# Patient Record
Sex: FEMALE | Race: WHITE | NOT HISPANIC OR LATINO | Employment: UNEMPLOYED | URBAN - METROPOLITAN AREA
[De-identification: names, ages, dates, MRNs, and addresses within clinical notes are randomized per-mention and may not be internally consistent; named-entity substitution may affect disease eponyms.]

---

## 2017-01-12 ENCOUNTER — TELEPHONE (OUTPATIENT)
Dept: OBSTETRICS AND GYNECOLOGY | Facility: CLINIC | Age: 47
End: 2017-01-12

## 2017-01-12 NOTE — TELEPHONE ENCOUNTER
----- Message from Shytahmina Enrique sent at 1/12/2017  2:15 PM CST -----  Contact: Self  Pt request mammogram order so she can schedule to get it done on the same day as her annual exam. Please contact the pt at [phone] if any question. Thanks!

## 2017-01-16 ENCOUNTER — TELEPHONE (OUTPATIENT)
Dept: OBSTETRICS AND GYNECOLOGY | Facility: CLINIC | Age: 47
End: 2017-01-16

## 2017-01-16 NOTE — TELEPHONE ENCOUNTER
----- Message from Erinn Forman sent at 1/13/2017  2:24 PM CST -----  Contact: Self  Pt called to check status of mammogram order. Pt can be reached @ [phone].    1/16/17  09:15am  Call placed to Ms Ambrocio, pamella , message left for pt to return call to office

## 2017-01-19 ENCOUNTER — OFFICE VISIT (OUTPATIENT)
Dept: OBSTETRICS AND GYNECOLOGY | Facility: CLINIC | Age: 47
End: 2017-01-19
Payer: MEDICAID

## 2017-01-19 VITALS
WEIGHT: 137.13 LBS | SYSTOLIC BLOOD PRESSURE: 140 MMHG | DIASTOLIC BLOOD PRESSURE: 90 MMHG | BODY MASS INDEX: 22.13 KG/M2

## 2017-01-19 DIAGNOSIS — Z01.419 WELL WOMAN EXAM WITH ROUTINE GYNECOLOGICAL EXAM: Primary | ICD-10-CM

## 2017-01-19 DIAGNOSIS — Z11.3 SCREEN FOR STD (SEXUALLY TRANSMITTED DISEASE): ICD-10-CM

## 2017-01-19 DIAGNOSIS — Z12.4 CERVICAL CANCER SCREENING: ICD-10-CM

## 2017-01-19 DIAGNOSIS — Z12.39 BREAST CANCER SCREENING: ICD-10-CM

## 2017-01-19 PROCEDURE — 99999 PR PBB SHADOW E&M-EST. PATIENT-LVL III: CPT | Mod: PBBFAC,,, | Performed by: OBSTETRICS & GYNECOLOGY

## 2017-01-19 PROCEDURE — 99386 PREV VISIT NEW AGE 40-64: CPT | Mod: S$PBB,,, | Performed by: OBSTETRICS & GYNECOLOGY

## 2017-01-19 PROCEDURE — 87591 N.GONORRHOEAE DNA AMP PROB: CPT

## 2017-01-19 PROCEDURE — 87624 HPV HI-RISK TYP POOLED RSLT: CPT

## 2017-01-19 PROCEDURE — 88175 CYTOPATH C/V AUTO FLUID REDO: CPT

## 2017-01-19 PROCEDURE — 99213 OFFICE O/P EST LOW 20 MIN: CPT | Mod: PBBFAC | Performed by: OBSTETRICS & GYNECOLOGY

## 2017-01-19 RX ORDER — LAMOTRIGINE 25 MG/1
25 TABLET ORAL DAILY
COMMUNITY
End: 2018-05-26

## 2017-01-19 RX ORDER — OXCARBAZEPINE 300 MG/1
TABLET, FILM COATED ORAL
Refills: 0 | COMMUNITY
Start: 2016-12-02 | End: 2017-01-19

## 2017-01-19 RX ORDER — BUPRENORPHINE HYDROCHLORIDE, NALOXONE HYDROCHLORIDE 8; 2 MG/1; MG/1
FILM, SOLUBLE BUCCAL; SUBLINGUAL
Refills: 4 | COMMUNITY
Start: 2017-01-16 | End: 2018-05-26

## 2017-01-19 RX ORDER — VARENICLINE TARTRATE 0.5 (11)-1
1 KIT ORAL
Refills: 0 | COMMUNITY
Start: 2017-01-13 | End: 2017-02-02

## 2017-01-19 NOTE — MR AVS SNAPSHOT
[facility] - OB/ GYN  [address], [address]  [place] [address]  Phone: [phone]                  Fabiola Ambrocio   2017 11:10 AM   Office Visit    Description:  Female : 1970   Provider:  Hai Peck MD   Department:  [facility] - OB/ GYN           Reason for Visit     Annual Exam                To Do List           Goals (5 Years of Data)     None      [facility]     [facility] On Call Nurse Care Line -  Assistance  Registered nurses in the [facility] Center provide clinical advisement, health education, appointment booking, and other advisory services.  Call for this free service at [phone].             Medications           Message regarding Medications     Verify the changes and/or additions to your medication regime listed below are the same as discussed with your clinician today.  If any of these changes or additions are incorrect, please notify your healthcare provider.             Verify that the below list of medications is an accurate representation of the medications you are currently taking.  If none reported, the list may be blank. If incorrect, please contact your healthcare provider. Carry this list with you in case of emergency.           Current Medications     citalopram (CELEXA) 40 MG tablet Take 1 tablet (40 mg total) by mouth once daily.    paroxetine (PAXIL) 20 MG tablet Take 20 mg by mouth every morning.    amlodipine (NORVASC) 5 MG tablet Take 1 tablet (5 mg total) by mouth once daily.    CHANTIX STARTING MONTH BOX 0.5 mg (11)- 1 mg (42) tablet Take 1 tablet by mouth as needed.    hydrochlorothiazide (HYDRODIURIL) 25 MG tablet Take 1 tablet (25 mg total) by mouth once daily.    lisinopril 10 MG tablet Take 1 tablet (10 mg total) by mouth once daily.    LURASIDONE HCL (LATUDA ORAL) Take by mouth.    oxcarbazepine (TRILEPTAL) 300 MG Tab TK 1 T PO  BID    SUBOXONE 8-2 mg Film DISSOLVE 1 film TWICE DAILY           Clinical Reference Information            Vital Signs - Last Recorded  Most recent update: 1/19/2017 11:24 AM by Glenis Harper MA    BP Wt LMP BMI       (!) 140/90 62.2 kg (137 lb 2 oz) 01/06/2017 (Exact Date) 22.13 kg/m2       Blood Pressure          Most Recent Value    BP  (!)  140/90      Allergies as of 1/19/2017     Sulfa (Sulfonamide Antibiotics)      Immunizations Administered on Date of Encounter - 1/19/2017     None      [facility] Sign-Up     Activating your [facility] account is as easy as 1-2-3!     1) Visit [email or web address], select Sign Up Now, enter this activation code and your date of birth, then select Next.  [email or web address]  Expires: 3/5/2017 11:29 AM      2) Create a username and password to use when you visit [facility] in the future and select a security question in case you lose your password and select Next.    3) Enter your e-mail address and click Sign Up!    Additional Information  If you have questions, please e-mail [email or web address] or call [phone] to talk to our [facility] staff. Remember, [facility] is NOT to be used for urgent needs. For medical emergencies, dial 911.         Smoking Cessation     If you would like to quit smoking:   You may be eligible for free services if you are a [place] resident and started smoking cigarettes before September 1, 1988.  Call the [employer]ess[employer] ([employer]) toll free at (523) 364-0038 or (358) 894-5632.   Call 5-800-QUIT-NOW if you do not meet the above criteria.

## 2017-01-20 NOTE — PROGRESS NOTES
[facility]  Ambulatory Clinic  Obstetrics & Gynecology    Visit Date:  2017    Chief Complaint:  Annual GYN exam    History of Present Illness:      Fabiola Ambrocio is a 46 y.o. , new pt to me, here for a gynecologic exam.      Pt has no major complaints today.     Periods are regular, not heavy or painful.    Her current method of family planning is condoms.    Pt denies family h/o adverse reaction to hormonal contraception.    Pt denies h/o abnormal pap, last pap ~8 years ago per pt.    Pt denies active sexually transmitted infections.    Pt states she has never had a mammogram.    Pt performs monthly self breast examination, trying to quite smoking, uses seat belts, and denies abuse.     Pt denies any abnormal vaginal bleeding, vaginal discharge, dysmenorrhea, dyspareunia, pelvic pain, bloating, early satiety, unintentional weight loss, breast mass/skin changes, incontinence, GI or urinary complaints.      Otherwise, the pt is in her usual state of health and has good follow-up with her PCP.    Past History:  Gynecologic history as noted above.    Review of Systems:      GENERAL:  No fever, fatigue, excessive weight gain or loss  HEENT:  No headaches, hearing changes, visual disturbance  RESPIRATORY:  No cough, shortness of breath  CARDIOVASCULAR:  No chest pain, heart palpitations, leg swelling  BREAST:  No lump, pain, nipple discharge, skin changes  GASTROINTESTINAL:  No nausea, vomiting, constipation, diarrhea, abd pain, rectal bleeding   GENITOURINARY:  See HPI  ENDOCRINE:  No heat or cold intolerance  HEMATOLOGIC:  No easy bruisability or bleeding   LYMPHATICS:  No enlarged nodes  MUSCULOSKELETAL:  Chronic back pain  SKIN:  No rash, lesions, jaundice  NEUROLOGIC:  No dizziness, weakness, syncope  PSYCHIATRIC:  No homicidal/suicidal ideations    Physical Exam:     Visit Vitals    BP (!) 140/90    Wt 62.2 kg (137 lb 2 oz)    LMP 2017 (Exact Date)    BMI 22.13 kg/m2    Pulse 60, Resp rate 16  Patient's last menstrual period was 2017 (exact date).     GENERAL:  No acute distress, well-nourished  HEENT:  Atraumatic, anicteric, moist mucus membranes. Neck supple w/o masses.  BREAST:  Symmetric, nontender, no obvious masses, adenopathy, skin changes or nipple discharge.  LUNGS:  Clear to auscultation  HEART:  Regular rate and rhythm, no murmurs, gallops, or rubs  ABDOMEN:  Soft, non-tender, non-distended, normoactive bowel sounds, no obvious organomegaly  EXT:  Symmetric w/o cramping, claudication, or edema. +2 distal pulses.  SKIN:  No rashes or bruising  PSYCH:  Mood and affect appropriate    GENITOURINARY:    VULVAR:  Female external genitalia w/o obvious lesions. Female hair distribution. Normal urethral meatus. No gross lymphadenopathy.   VAGINA:  Pink, moist, well-rugated. Good support. No obvious lesion. No discharge.  CERVIX:  No cervical motion tenderness, discharge, or obvious lesions.   UTERUS:  Small, non-tender, normal contour  ADNEXA:  No masses, non-tender    RECTAL:  Declined. No obvious external lesions  WET PREP:  Negative     Chaperone present for exam.    Assessment:     46 y.o. :    1. Well woman gynecologic exam  2. Family planning    Plan:    A gynecologic health assessment was performed with age appropriate counseling.    Cervical cancer screening - pap obtained.    STI screening - pt requested gonorrhea & chlamydia testing.  Pt declined other STI testing including HIV.  Safe sex discussed.      Order screening mammogram.    We discuss her contraceptive options.  Pt is requesting Copper IUD.  Risks, benefits, and alternatives to IUD reviewed.  Will schedule pt for IUD insertion on received pending insurance verification.  I meantime time, she will use condoms.      Encourage healthy lifestyle modifications, monthly self breast exams, Ca/Vit D.    Encourage tobacco cessation, pt is in smoking cessation therapy.    F/u with PCP for health  maintenance.    Pt will call clinic to schedule her IUD insertion within 7 days at start of her next cycle once IUD received.    Return sooner as needed.  All questions answered, pt voiced understanding.        Hai Peck MD

## 2017-01-23 LAB
C TRACH DNA SPEC QL NAA+PROBE: NEGATIVE
N GONORRHOEA DNA SPEC QL NAA+PROBE: NEGATIVE

## 2017-02-02 ENCOUNTER — CLINICAL SUPPORT (OUTPATIENT)
Dept: SMOKING CESSATION | Facility: CLINIC | Age: 47
End: 2017-02-02
Payer: COMMERCIAL

## 2017-02-02 VITALS — WEIGHT: 137 LBS | SYSTOLIC BLOOD PRESSURE: 140 MMHG | DIASTOLIC BLOOD PRESSURE: 90 MMHG | BODY MASS INDEX: 22.11 KG/M2

## 2017-02-02 DIAGNOSIS — F17.200 NICOTINE DEPENDENCE: Primary | ICD-10-CM

## 2017-02-02 PROCEDURE — 99999 PR PBB SHADOW E&M-EST. PATIENT-LVL II: CPT | Mod: PBBFAC,,,

## 2017-02-02 PROCEDURE — 99404 PREV MED CNSL INDIV APPRX 60: CPT | Mod: S$GLB,,,

## 2017-02-02 RX ORDER — VARENICLINE TARTRATE 0.5 (11)-1
KIT ORAL
Qty: 1 PACKAGE | Refills: 0 | Status: SHIPPED | OUTPATIENT
Start: 2017-02-02 | End: 2018-05-26

## 2017-02-02 NOTE — Clinical Note
Pt seen at intake today. She currently smokes 20-30 cigs/day.Patient has used it in the past for a quit and she said it is the only thing she has tried that works.  Pt said she has used NRT patches , lozenge and gum and was unsuccessful.   She said her skin does not tolerate the patch. Pt started on rate reduction and wait time of 15 min prior to smoking. Will see pt back in office within 2 weeks . Patient agrees to weekly appointments but she has to wait until her  gets his work schedule to schedule the first visit. She also understands that mail order will be used and that will require 1-2 weeks for delivery.  Pt's exhaled carbon monoxide level was 16   ppm as per [employer].  Please see Tobacco Cessation Intake Form for patient assessment and recommendations.

## 2017-02-07 PROBLEM — I10 ESSENTIAL HYPERTENSION, BENIGN: Status: ACTIVE | Noted: 2017-02-07

## 2017-02-07 PROBLEM — Z72.0 TOBACCO ABUSE: Status: ACTIVE | Noted: 2017-02-07

## 2017-02-07 PROBLEM — Z71.6 TOBACCO ABUSE COUNSELING: Status: ACTIVE | Noted: 2017-02-07

## 2017-02-07 PROBLEM — F31.9 BIPOLAR DISORDER: Status: ACTIVE | Noted: 2017-02-07

## 2017-02-07 PROBLEM — M54.12 CERVICAL RADICULOPATHY: Status: ACTIVE | Noted: 2017-02-07

## 2017-05-11 ENCOUNTER — TELEPHONE (OUTPATIENT)
Dept: OBSTETRICS AND GYNECOLOGY | Facility: CLINIC | Age: 47
End: 2017-05-11

## 2017-05-11 NOTE — TELEPHONE ENCOUNTER
Touched basis with the pt to see if she was still interested in the [employer]ag[employer](buy and bill) and she stated she thought she spoke to someone and told them she didn't want it, her  didn't want her with it. I informed her I will make sure to document in her chart. Pt voiced understanding and call ended. kt

## 2018-01-18 ENCOUNTER — CLINICAL SUPPORT (OUTPATIENT)
Dept: SMOKING CESSATION | Facility: CLINIC | Age: 48
End: 2018-01-18
Payer: COMMERCIAL

## 2018-01-18 DIAGNOSIS — F17.200 NICOTINE DEPENDENCE: Primary | ICD-10-CM

## 2018-01-18 PROCEDURE — 99406 BEHAV CHNG SMOKING 3-10 MIN: CPT | Mod: S$GLB,,, | Performed by: INTERNAL MEDICINE

## 2018-05-26 ENCOUNTER — HOSPITAL ENCOUNTER (EMERGENCY)
Facility: HOSPITAL | Age: 48
Discharge: HOME | End: 2018-05-26
Attending: EMERGENCY MEDICINE
Payer: MEDICAID

## 2018-05-26 VITALS
OXYGEN SATURATION: 98 % | SYSTOLIC BLOOD PRESSURE: 129 MMHG | HEART RATE: 100 BPM | WEIGHT: 130 LBS | DIASTOLIC BLOOD PRESSURE: 66 MMHG | HEIGHT: 66 IN | BODY MASS INDEX: 20.89 KG/M2 | RESPIRATION RATE: 18 BRPM | TEMPERATURE: 98 F

## 2018-05-26 DIAGNOSIS — R07.9 CHEST PAIN: ICD-10-CM

## 2018-05-26 DIAGNOSIS — F14.90 COCAINE USE: ICD-10-CM

## 2018-05-26 DIAGNOSIS — I10 UNCONTROLLED HYPERTENSION: Primary | ICD-10-CM

## 2018-05-26 LAB
ALBUMIN SERPL BCP-MCNC: 3.9 G/DL
ALP SERPL-CCNC: 73 U/L
ALT SERPL W/O P-5'-P-CCNC: 23 U/L
ANION GAP SERPL CALC-SCNC: 12 MMOL/L
AST SERPL-CCNC: 25 U/L
B-HCG UR QL: NEGATIVE
BACTERIA #/AREA URNS HPF: ABNORMAL /HPF
BASOPHILS # BLD AUTO: 0.02 K/UL
BASOPHILS NFR BLD: 0.2 %
BILIRUB SERPL-MCNC: 0.8 MG/DL
BILIRUB UR QL STRIP: NEGATIVE
BNP SERPL-MCNC: 158 PG/ML
BUN SERPL-MCNC: 13 MG/DL
CALCIUM SERPL-MCNC: 9.2 MG/DL
CHLORIDE SERPL-SCNC: 102 MMOL/L
CLARITY UR: CLEAR
CO2 SERPL-SCNC: 25 MMOL/L
COLOR UR: ABNORMAL
CREAT SERPL-MCNC: 1.1 MG/DL
CTP QC/QA: YES
DIFFERENTIAL METHOD: ABNORMAL
EOSINOPHIL # BLD AUTO: 0 K/UL
EOSINOPHIL NFR BLD: 0.2 %
ERYTHROCYTE [DISTWIDTH] IN BLOOD BY AUTOMATED COUNT: 12.1 %
EST. GFR  (AFRICAN AMERICAN): >60 ML/MIN/1.73 M^2
EST. GFR  (NON AFRICAN AMERICAN): >60 ML/MIN/1.73 M^2
GLUCOSE SERPL-MCNC: 102 MG/DL
GLUCOSE UR QL STRIP: NEGATIVE
HCT VFR BLD AUTO: 45 %
HGB BLD-MCNC: 16.2 G/DL
HGB UR QL STRIP: ABNORMAL
HYALINE CASTS #/AREA URNS LPF: 0 /LPF
INR PPP: 0.9
KETONES UR QL STRIP: NEGATIVE
LEUKOCYTE ESTERASE UR QL STRIP: ABNORMAL
LIPASE SERPL-CCNC: 35 U/L
LYMPHOCYTES # BLD AUTO: 1.4 K/UL
LYMPHOCYTES NFR BLD: 12.6 %
MAGNESIUM SERPL-MCNC: 2.1 MG/DL
MCH RBC QN AUTO: 36.3 PG
MCHC RBC AUTO-ENTMCNC: 36 G/DL
MCV RBC AUTO: 101 FL
MICROSCOPIC COMMENT: ABNORMAL
MONOCYTES # BLD AUTO: 0.6 K/UL
MONOCYTES NFR BLD: 5.6 %
NEUTROPHILS # BLD AUTO: 8.9 K/UL
NEUTROPHILS NFR BLD: 81.4 %
NITRITE UR QL STRIP: POSITIVE
PH UR STRIP: 5 [PH] (ref 5–8)
PLATELET # BLD AUTO: 230 K/UL
PMV BLD AUTO: 9.4 FL
POTASSIUM SERPL-SCNC: 3.3 MMOL/L
PROT SERPL-MCNC: 7.5 G/DL
PROT UR QL STRIP: ABNORMAL
PROTHROMBIN TIME: 9.4 SEC
RBC # BLD AUTO: 4.46 M/UL
RBC #/AREA URNS HPF: >100 /HPF (ref 0–4)
SODIUM SERPL-SCNC: 139 MMOL/L
SP GR UR STRIP: 1.02 (ref 1–1.03)
SQUAMOUS #/AREA URNS HPF: 4 /HPF
TROPONIN I SERPL DL<=0.01 NG/ML-MCNC: 0.01 NG/ML
URN SPEC COLLECT METH UR: ABNORMAL
UROBILINOGEN UR STRIP-ACNC: ABNORMAL EU/DL
WBC # BLD AUTO: 10.89 K/UL
WBC #/AREA URNS HPF: 50 /HPF (ref 0–5)

## 2018-05-26 PROCEDURE — S4991 NICOTINE PATCH NONLEGEND: HCPCS | Performed by: EMERGENCY MEDICINE

## 2018-05-26 PROCEDURE — 96361 HYDRATE IV INFUSION ADD-ON: CPT

## 2018-05-26 PROCEDURE — 83880 ASSAY OF NATRIURETIC PEPTIDE: CPT

## 2018-05-26 PROCEDURE — 81025 URINE PREGNANCY TEST: CPT | Performed by: PHYSICIAN ASSISTANT

## 2018-05-26 PROCEDURE — 99284 EMERGENCY DEPT VISIT MOD MDM: CPT | Mod: 25

## 2018-05-26 PROCEDURE — 81000 URINALYSIS NONAUTO W/SCOPE: CPT

## 2018-05-26 PROCEDURE — 93005 ELECTROCARDIOGRAM TRACING: CPT

## 2018-05-26 PROCEDURE — 80053 COMPREHEN METABOLIC PANEL: CPT

## 2018-05-26 PROCEDURE — 25000003 PHARM REV CODE 250: Performed by: EMERGENCY MEDICINE

## 2018-05-26 PROCEDURE — 85610 PROTHROMBIN TIME: CPT

## 2018-05-26 PROCEDURE — 83690 ASSAY OF LIPASE: CPT

## 2018-05-26 PROCEDURE — 96374 THER/PROPH/DIAG INJ IV PUSH: CPT

## 2018-05-26 PROCEDURE — 63600175 PHARM REV CODE 636 W HCPCS: Performed by: EMERGENCY MEDICINE

## 2018-05-26 PROCEDURE — 93010 ELECTROCARDIOGRAM REPORT: CPT | Mod: ,,, | Performed by: INTERNAL MEDICINE

## 2018-05-26 PROCEDURE — 85025 COMPLETE CBC W/AUTO DIFF WBC: CPT

## 2018-05-26 PROCEDURE — 83735 ASSAY OF MAGNESIUM: CPT

## 2018-05-26 PROCEDURE — 84484 ASSAY OF TROPONIN QUANT: CPT

## 2018-05-26 PROCEDURE — 96375 TX/PRO/DX INJ NEW DRUG ADDON: CPT

## 2018-05-26 RX ORDER — CLONIDINE HYDROCHLORIDE 0.1 MG/1
0.2 TABLET ORAL
Status: COMPLETED | OUTPATIENT
Start: 2018-05-26 | End: 2018-05-26

## 2018-05-26 RX ORDER — HYDRALAZINE HYDROCHLORIDE 20 MG/ML
20 INJECTION INTRAMUSCULAR; INTRAVENOUS
Status: COMPLETED | OUTPATIENT
Start: 2018-05-26 | End: 2018-05-26

## 2018-05-26 RX ORDER — IBUPROFEN 200 MG
1 TABLET ORAL
Status: DISCONTINUED | OUTPATIENT
Start: 2018-05-26 | End: 2018-05-26 | Stop reason: HOSPADM

## 2018-05-26 RX ORDER — LORAZEPAM 0.5 MG/1
2 TABLET ORAL
Status: COMPLETED | OUTPATIENT
Start: 2018-05-26 | End: 2018-05-26

## 2018-05-26 RX ORDER — LISINOPRIL 10 MG/1
40 TABLET ORAL DAILY
Qty: 90 TABLET | Refills: 0 | Status: SHIPPED | OUTPATIENT
Start: 2018-05-26 | End: 2021-11-16 | Stop reason: ALTCHOICE

## 2018-05-26 RX ADMIN — PROMETHAZINE HYDROCHLORIDE 12.5 MG: 25 INJECTION INTRAMUSCULAR; INTRAVENOUS at 06:05

## 2018-05-26 RX ADMIN — LORAZEPAM 2 MG: 0.5 TABLET ORAL at 05:05

## 2018-05-26 RX ADMIN — SODIUM CHLORIDE 1000 ML: 0.9 INJECTION, SOLUTION INTRAVENOUS at 05:05

## 2018-05-26 RX ADMIN — HYDRALAZINE HYDROCHLORIDE 20 MG: 20 INJECTION INTRAMUSCULAR; INTRAVENOUS at 06:05

## 2018-05-26 RX ADMIN — NICOTINE 1 PATCH: 21 PATCH, EXTENDED RELEASE TRANSDERMAL at 05:05

## 2018-05-26 RX ADMIN — CLONIDINE HYDROCHLORIDE 0.2 MG: 0.1 TABLET ORAL at 06:05

## 2018-05-26 NOTE — ED PROVIDER NOTES
Encounter Date: 2018    SORT:  47 y.o. female presenting to ED for CP since this morning. Emesis yesterday. Smoker. EtOH yesterday and Hx of pancreatitis. Denies fever. Limited triage exam:  Non-toxic. If orders were placed, they are pending.      Gaurang Mittal PA-C  2018  4:42 PM      History     Chief Complaint   Patient presents with    Chest Pain     chest pain today.  + vomiting yesterday.  center chest, non-radiating, sharp in nature.   denies diarrhea or fever.    Hypertension     states pressure at home was 269/170.  been non-compliant w/ meds.  did take it today.  denies headaches.     47-year-old female history of alcoholic pancreatitis cocaine use and hypertension noncompliant with medications but very compliant with her cocaine use presents with chest pain and elevated blood pressure denies any abdominal pain nausea vomiting diarrhea constipation no hematemesis melena or any GI issues no headaches          Review of patient's allergies indicates:   Allergen Reactions    Sulfa (sulfonamide antibiotics) Rash     Past Medical History:   Diagnosis Date    Anxiety     Bipolar disorder     Hypertension     Pancreatitis      Past Surgical History:   Procedure Laterality Date    BACK SURGERY      basal cell carcinoma surgery       SECTION       Family History   Problem Relation Age of Onset    Drug abuse Mother     Drug abuse Father     Cancer Paternal Grandmother         breast     Social History   Substance Use Topics    Smoking status: Current Every Day Smoker     Packs/day: 1.50     Types: Cigarettes    Smokeless tobacco: Never Used    Alcohol use 3.0 oz/week     5 Shots of liquor per week      Comment: every day     Review of Systems   Constitutional: Negative.    HENT: Negative.    Eyes: Negative.    Respiratory: Negative.    Cardiovascular: Positive for chest pain.   Gastrointestinal: Negative.    Endocrine: Negative.    Genitourinary: Negative.    Musculoskeletal:  Negative.    Neurological: Negative.    Hematological: Negative.    Psychiatric/Behavioral: Negative.    All other systems reviewed and are negative.      Physical Exam     Initial Vitals [05/26/18 1639]   BP Pulse Resp Temp SpO2   (!) 220/127 98 18 97.4 °F (36.3 °C) 99 %      MAP       158         Physical Exam    Nursing note and vitals reviewed.  Constitutional: She appears well-developed and well-nourished.   HENT:   Head: Normocephalic and atraumatic.   Eyes: Conjunctivae and EOM are normal. Pupils are equal, round, and reactive to light.   Neck: Normal range of motion. Neck supple.   Cardiovascular: Normal rate and regular rhythm.   Abdominal: Soft. Bowel sounds are normal.   Musculoskeletal: Normal range of motion.   Neurological: She is alert and oriented to person, place, and time. She has normal reflexes.   Skin: Skin is warm and dry.   Psychiatric: She has a normal mood and affect. Her behavior is normal. Judgment and thought content normal.         ED Course   Procedures  Labs Reviewed   URINALYSIS - Abnormal; Notable for the following:        Result Value    Protein, UA 2+ (*)     Occult Blood UA 3+ (*)     Nitrite, UA Positive (*)     Urobilinogen, UA 4.0-6.0 (*)     Leukocytes, UA 1+ (*)     All other components within normal limits   CBC W/ AUTO DIFFERENTIAL - Abnormal; Notable for the following:     Hemoglobin 16.2 (*)      (*)     MCH 36.3 (*)     Gran # (ANC) 8.9 (*)     Gran% 81.4 (*)     Lymph% 12.6 (*)     All other components within normal limits   COMPREHENSIVE METABOLIC PANEL - Abnormal; Notable for the following:     Potassium 3.3 (*)     All other components within normal limits   B-TYPE NATRIURETIC PEPTIDE - Abnormal; Notable for the following:      (*)     All other components within normal limits   URINALYSIS MICROSCOPIC - Abnormal; Notable for the following:     RBC, UA >100 (*)     WBC, UA 50 (*)     Bacteria, UA Many (*)     All other components within normal limits    LIPASE   TROPONIN I   PROTIME-INR   MAGNESIUM   POCT URINE PREGNANCY                          Attending Attestation:     Physician Attestation Statement for NP/PA:   I have conducted a face to face encounter with this patient in addition to the NP/PA, due to          Attending ED Notes:   Blood pressure decreased patient improved and will dc home              Clinical Impression:   The primary encounter diagnosis was Uncontrolled hypertension. Diagnoses of Chest pain and Cocaine use were also pertinent to this visit.    Disposition:   Disposition: Discharged  Condition: Stable                        Steffen Soliz MD  05/26/18 3211

## 2018-05-26 NOTE — ED TRIAGE NOTES
Patient C/O chest pain that started this morning when waking up. Patient stated that pain is 6/10 and sharp with intermittent SOB. Patient stated that she has HTN but doesn't take medication for it. She has recently taken her Mothers medication of Lisinopril. Patient took Ibuprofen this morning for pain with no relief.

## 2018-05-27 NOTE — ED NOTES
Pt care assume. Pt AAO with RR easy even and full. NAD noted at this time. Awaiting further orders. Will continue to monitor.

## 2018-05-27 NOTE — ED NOTES
PIC d/c'd with cath tip intact. Bandage placed at site. Discharge instructions provided and pt verbalized understanding of need of follow up and medications.

## 2018-10-05 ENCOUNTER — HOSPITAL ENCOUNTER (OUTPATIENT)
Facility: HOSPITAL | Age: 48
Discharge: HOME | End: 2018-10-06
Attending: EMERGENCY MEDICINE | Admitting: EMERGENCY MEDICINE
Payer: MEDICAID

## 2018-10-05 DIAGNOSIS — N39.0 URINARY TRACT INFECTION WITHOUT HEMATURIA, SITE UNSPECIFIED: Primary | ICD-10-CM

## 2018-10-05 DIAGNOSIS — R25.1 TREMOR: ICD-10-CM

## 2018-10-05 DIAGNOSIS — K29.20 ALCOHOLIC GASTRITIS, PRESENCE OF BLEEDING UNSPECIFIED, UNSPECIFIED CHRONICITY: ICD-10-CM

## 2018-10-05 DIAGNOSIS — I10 UNCONTROLLED HYPERTENSION: ICD-10-CM

## 2018-10-05 LAB
ALBUMIN SERPL BCP-MCNC: 3.9 G/DL
ALP SERPL-CCNC: 76 U/L
ALT SERPL W/O P-5'-P-CCNC: 22 U/L
ANION GAP SERPL CALC-SCNC: 13 MMOL/L
AST SERPL-CCNC: 23 U/L
B-HCG UR QL: NEGATIVE
BACTERIA #/AREA URNS HPF: ABNORMAL /HPF
BASOPHILS # BLD AUTO: 0.01 K/UL
BASOPHILS NFR BLD: 0.1 %
BILIRUB SERPL-MCNC: 1 MG/DL
BILIRUB UR QL STRIP: NEGATIVE
BUN SERPL-MCNC: 11 MG/DL
CALCIUM SERPL-MCNC: 9.8 MG/DL
CHLORIDE SERPL-SCNC: 96 MMOL/L
CLARITY UR: ABNORMAL
CO2 SERPL-SCNC: 27 MMOL/L
COLOR UR: YELLOW
CREAT SERPL-MCNC: 1.2 MG/DL
CTP QC/QA: YES
DIFFERENTIAL METHOD: ABNORMAL
EOSINOPHIL # BLD AUTO: 0 K/UL
EOSINOPHIL NFR BLD: 0 %
ERYTHROCYTE [DISTWIDTH] IN BLOOD BY AUTOMATED COUNT: 12.6 %
EST. GFR  (AFRICAN AMERICAN): >60 ML/MIN/1.73 M^2
EST. GFR  (NON AFRICAN AMERICAN): 54 ML/MIN/1.73 M^2
ETHANOL SERPL-MCNC: <10 MG/DL
GLUCOSE SERPL-MCNC: 117 MG/DL
GLUCOSE UR QL STRIP: NEGATIVE
HCT VFR BLD AUTO: 44 %
HGB BLD-MCNC: 15.5 G/DL
HGB UR QL STRIP: ABNORMAL
HYALINE CASTS #/AREA URNS LPF: 0 /LPF
KETONES UR QL STRIP: NEGATIVE
LEUKOCYTE ESTERASE UR QL STRIP: ABNORMAL
LIPASE SERPL-CCNC: 17 U/L
LYMPHOCYTES # BLD AUTO: 0.7 K/UL
LYMPHOCYTES NFR BLD: 5.8 %
MCH RBC QN AUTO: 36.7 PG
MCHC RBC AUTO-ENTMCNC: 35.2 G/DL
MCV RBC AUTO: 104 FL
MICROSCOPIC COMMENT: ABNORMAL
MONOCYTES # BLD AUTO: 0.8 K/UL
MONOCYTES NFR BLD: 6.8 %
NEUTROPHILS # BLD AUTO: 10.2 K/UL
NEUTROPHILS NFR BLD: 87.3 %
NITRITE UR QL STRIP: POSITIVE
PH UR STRIP: 7 [PH] (ref 5–8)
PLATELET # BLD AUTO: 152 K/UL
PMV BLD AUTO: 9.6 FL
POTASSIUM SERPL-SCNC: 3.5 MMOL/L
PROT SERPL-MCNC: 8 G/DL
PROT UR QL STRIP: ABNORMAL
RBC # BLD AUTO: 4.22 M/UL
RBC #/AREA URNS HPF: 12 /HPF (ref 0–4)
SODIUM SERPL-SCNC: 136 MMOL/L
SP GR UR STRIP: 1 (ref 1–1.03)
SQUAMOUS #/AREA URNS HPF: 6 /HPF
URN SPEC COLLECT METH UR: ABNORMAL
UROBILINOGEN UR STRIP-ACNC: NEGATIVE EU/DL
WBC # BLD AUTO: 11.73 K/UL
WBC #/AREA URNS HPF: >100 /HPF (ref 0–5)

## 2018-10-05 PROCEDURE — 63600175 PHARM REV CODE 636 W HCPCS: Performed by: PHYSICIAN ASSISTANT

## 2018-10-05 PROCEDURE — 85025 COMPLETE CBC W/AUTO DIFF WBC: CPT

## 2018-10-05 PROCEDURE — S0028 INJECTION, FAMOTIDINE, 20 MG: HCPCS | Performed by: PHYSICIAN ASSISTANT

## 2018-10-05 PROCEDURE — 83690 ASSAY OF LIPASE: CPT

## 2018-10-05 PROCEDURE — 80053 COMPREHEN METABOLIC PANEL: CPT

## 2018-10-05 PROCEDURE — 96375 TX/PRO/DX INJ NEW DRUG ADDON: CPT

## 2018-10-05 PROCEDURE — 80320 DRUG SCREEN QUANTALCOHOLS: CPT

## 2018-10-05 PROCEDURE — 96361 HYDRATE IV INFUSION ADD-ON: CPT

## 2018-10-05 PROCEDURE — 99285 EMERGENCY DEPT VISIT HI MDM: CPT | Mod: 25

## 2018-10-05 PROCEDURE — 81000 URINALYSIS NONAUTO W/SCOPE: CPT

## 2018-10-05 PROCEDURE — 87088 URINE BACTERIA CULTURE: CPT

## 2018-10-05 PROCEDURE — 96365 THER/PROPH/DIAG IV INF INIT: CPT

## 2018-10-05 PROCEDURE — 87077 CULTURE AEROBIC IDENTIFY: CPT

## 2018-10-05 PROCEDURE — 87086 URINE CULTURE/COLONY COUNT: CPT

## 2018-10-05 PROCEDURE — G0378 HOSPITAL OBSERVATION PER HR: HCPCS

## 2018-10-05 PROCEDURE — 25000003 PHARM REV CODE 250: Performed by: PHYSICIAN ASSISTANT

## 2018-10-05 PROCEDURE — 81025 URINE PREGNANCY TEST: CPT | Performed by: NURSE PRACTITIONER

## 2018-10-05 PROCEDURE — 87186 SC STD MICRODIL/AGAR DIL: CPT

## 2018-10-05 RX ORDER — ONDANSETRON 2 MG/ML
8 INJECTION INTRAMUSCULAR; INTRAVENOUS
Status: COMPLETED | OUTPATIENT
Start: 2018-10-05 | End: 2018-10-05

## 2018-10-05 RX ORDER — ACETAMINOPHEN 500 MG
1000 TABLET ORAL
Status: COMPLETED | OUTPATIENT
Start: 2018-10-05 | End: 2018-10-05

## 2018-10-05 RX ORDER — LORAZEPAM 1 MG/1
0.5 TABLET ORAL EVERY 8 HOURS PRN
Qty: 10 TABLET | Refills: 0 | Status: SHIPPED | OUTPATIENT
Start: 2018-10-05 | End: 2018-10-06 | Stop reason: HOSPADM

## 2018-10-05 RX ORDER — FAMOTIDINE 10 MG/ML
20 INJECTION INTRAVENOUS
Status: COMPLETED | OUTPATIENT
Start: 2018-10-05 | End: 2018-10-05

## 2018-10-05 RX ORDER — CEPHALEXIN 500 MG/1
500 CAPSULE ORAL EVERY 12 HOURS
Qty: 14 CAPSULE | Refills: 0 | Status: SHIPPED | OUTPATIENT
Start: 2018-10-05 | End: 2018-10-06 | Stop reason: HOSPADM

## 2018-10-05 RX ORDER — LORAZEPAM 2 MG/ML
1 INJECTION INTRAMUSCULAR
Status: COMPLETED | OUTPATIENT
Start: 2018-10-05 | End: 2018-10-05

## 2018-10-05 RX ADMIN — FAMOTIDINE 20 MG: 10 INJECTION, SOLUTION INTRAVENOUS at 06:10

## 2018-10-05 RX ADMIN — ONDANSETRON HYDROCHLORIDE 8 MG: 2 INJECTION INTRAMUSCULAR; INTRAVENOUS at 06:10

## 2018-10-05 RX ADMIN — LIDOCAINE HYDROCHLORIDE: 20 SOLUTION ORAL; TOPICAL at 06:10

## 2018-10-05 RX ADMIN — LORAZEPAM 1 MG: 2 INJECTION INTRAMUSCULAR; INTRAVENOUS at 08:10

## 2018-10-05 RX ADMIN — CEFTRIAXONE 1 G: 1 INJECTION, SOLUTION INTRAVENOUS at 08:10

## 2018-10-05 RX ADMIN — SODIUM CHLORIDE 1000 ML: 0.9 INJECTION, SOLUTION INTRAVENOUS at 06:10

## 2018-10-05 RX ADMIN — ACETAMINOPHEN 1000 MG: 500 TABLET, FILM COATED ORAL at 08:10

## 2018-10-05 NOTE — ED TRIAGE NOTES
"Pt presents to ED c/o back pain that radiates around to abdomen since yesterday. "It feels like my pancreas is inflamed." Pt has hx pancreatitis. Pt also reports numbness to guera feet. "I also have a UTI, and I live with high blood pressure." Reports daily ETOH use over past week.       "

## 2018-10-05 NOTE — ED PROVIDER NOTES
"Encounter Date: 10/5/2018  SORT MSE:  Pt is a 48 y.o. female who presents for emergent consideration for generalized abd pain. Pt will be moved to room when one is available, otherwise will wait in waiting room with triage nurse supervision.  Pt arrived by ambulatory. She is not in distress. Orders have been placed. ARCENIO Conde DNP ACNP-BC 10/5/2018 6:13 PM        History     Chief Complaint   Patient presents with    Abdominal Pain     bilateral abdominal pain that radiates to lower back; pt reports buring with urination; denies discharge/foul odor    Numbness     "pins and needles" feeling to bilateral feet; pt stated that she is noncompliant with HTN medication and when BP is elevated has this senstation to feet     Chief Complaint:  Abdominal pain  History of  Present Illness: History obtained from patient. This 48 y.o. female who has pancreatitis and hypertension presents to the ED complaining of 9/10 sharp stabbing epigastric abdominal pain radiating to her back that began yesterday.  She states that she has been drinking alcohol daily for the last few months but increased her alcohol intake last night.  She reports 4 episodes of nonbilious non bloody emesis yesterday.  Patient denies nausea currently and reports no vomiting today.  She also complains of dysuria.  She denies diarrhea, constipation, hematemesis, urinary frequency, hematuria, vaginal discharge, vaginal bleeding, fever, chills, chest pain, shortness of breath, headache, weakness, dizziness.  No prior tracing.  She reports last exacerbation of her pancreatitis 4 years ago.  She states she has been to rehab for alcohol 2 times with last rehabilitation last year.  She states she was treated with Ativan.  She states she was sober for about 3 months.  She states she has had withdrawal symptoms in the past due to alcohol.          Review of patient's allergies indicates:   Allergen Reactions    Sulfa (sulfonamide antibiotics) Rash     Past Medical " History:   Diagnosis Date    Anxiety     Bipolar disorder     Hypertension     Pancreatitis      Past Surgical History:   Procedure Laterality Date    BACK SURGERY      basal cell carcinoma surgery       SECTION       Family History   Problem Relation Age of Onset    Drug abuse Mother     Drug abuse Father     Cancer Paternal Grandmother         breast     Social History     Tobacco Use    Smoking status: Current Every Day Smoker     Packs/day: 1.50     Types: Cigarettes    Smokeless tobacco: Never Used   Substance Use Topics    Alcohol use: Yes     Alcohol/week: 3.0 oz     Types: 5 Shots of liquor per week     Comment: every day    Drug use: Yes     Types: Cocaine, Marijuana     Comment: subboxin, crack cocaine; last time used 2 days ago      Review of Systems   Constitutional: Negative for chills and fever.   HENT: Negative for sore throat.    Eyes: Negative for pain.   Respiratory: Negative for cough and shortness of breath.    Cardiovascular: Negative for chest pain.   Gastrointestinal: Positive for abdominal pain, nausea and vomiting. Negative for diarrhea.   Genitourinary: Positive for dysuria. Negative for frequency, hematuria, vaginal bleeding and vaginal discharge.   Musculoskeletal: Positive for back pain.   Skin: Negative for rash.   Neurological: Negative for dizziness, weakness and headaches.       Physical Exam     Initial Vitals [10/05/18 1816]   BP Pulse Resp Temp SpO2   (!) 167/91 98 18 98.1 °F (36.7 °C) 99 %      MAP       --         Physical Exam    Nursing note and vitals reviewed.  Constitutional: She appears well-developed and well-nourished. No distress.   Patient appears uncomfortable.   HENT:   Head: Normocephalic and atraumatic.   Mouth/Throat: Uvula is midline. Mucous membranes are dry.   Eyes: EOM are normal. Pupils are equal, round, and reactive to light.   Neck: Normal range of motion. Neck supple.   Cardiovascular: Normal rate, regular rhythm and normal heart  sounds. Exam reveals no gallop and no friction rub.    No murmur heard.  Pulmonary/Chest: Breath sounds normal. No respiratory distress. She has no wheezes. She has no rhonchi. She has no rales.   Abdominal: Soft. Bowel sounds are normal. She exhibits no distension and no ascites. There is tenderness in the epigastric area. There is guarding. There is no rigidity, no rebound and no CVA tenderness.   Musculoskeletal: Normal range of motion.   Neurological: She is alert and oriented to person, place, and time. No sensory deficit.   Skin: Skin is warm and dry. Capillary refill takes less than 2 seconds.   Psychiatric: She has a normal mood and affect.         ED Course   Procedures  Labs Reviewed   CBC W/ AUTO DIFFERENTIAL - Abnormal; Notable for the following components:       Result Value     (*)     MCH 36.7 (*)     Gran # (ANC) 10.2 (*)     Lymph # 0.7 (*)     Gran% 87.3 (*)     Lymph% 5.8 (*)     All other components within normal limits   COMPREHENSIVE METABOLIC PANEL - Abnormal; Notable for the following components:    Glucose 117 (*)     eGFR if non  54 (*)     All other components within normal limits   URINALYSIS, REFLEX TO URINE CULTURE - Abnormal; Notable for the following components:    Appearance, UA Hazy (*)     Protein, UA 1+ (*)     Occult Blood UA 2+ (*)     Nitrite, UA Positive (*)     Leukocytes, UA 3+ (*)     All other components within normal limits   URINALYSIS MICROSCOPIC - Abnormal; Notable for the following components:    RBC, UA 12 (*)     WBC, UA >100 (*)     Bacteria, UA Moderate (*)     All other components within normal limits   CULTURE, URINE   LIPASE   ALCOHOL,MEDICAL (ETHANOL)   LACTIC ACID, PLASMA   POCT URINE PREGNANCY          Imaging Results          CT Abdomen Pelvis  Without Contrast (Final result)  Result time 10/05/18 20:02:54    Final result by Abram Saenz MD (10/05/18 20:02:54)                 Impression:      1. Hepatomegaly, correlation with  LFTs recommended.  2. Findings suggesting constipation.  3. Mild distention of the urinary bladder, noting possible wall thickening versus artifact, correlation with urinalysis recommended.  4. No significant peripancreatic inflammation to suggest pancreatitis as clinically questioned, please note, pancreatitis cannot be excluded by CT.  5. Cholelithiasis, and additional findings above.      Electronically signed by: Abram Saenz MD  Date:    10/05/2018  Time:    20:02             Narrative:    EXAMINATION:  CT ABDOMEN PELVIS WITHOUT CONTRAST    CLINICAL HISTORY:  epigastric abdominal pain, evaluate for pancreatitis;    TECHNIQUE:  Low dose axial images, sagittal and coronal reformations were obtained from the lung bases to the pubic symphysis.  Oral contrast was not administered.    COMPARISON:  None    FINDINGS:  Images of the lower thorax are unremarkable.    The liver is enlarged, correlation with LFTs recommended.  The spleen, pancreas and adrenal glands have a grossly unremarkable noncontrast appearance.  There is cholelithiasis without gallbladder inflammation.  There is no biliary dilation or ascites.  No significant abdominal lymphadenopathy.    The kidneys have a grossly unremarkable noncontrast appearance without hydronephrosis or nephrolithiasis.  The bilateral ureters are grossly unremarkable without calculi seen.  The urinary bladder is distended without wall thickening.  The uterus and adnexa is grossly unremarkable.    There is moderate stool in the right colon.  There is some formed stool within the terminal ileum, a nonspecific finding that can be associated with constipation.  The suspected appendix is grossly unremarkable, no pericecal inflammation the small bowel is otherwise grossly unremarkable.  No focal organized pelvic fluid collection.    Degenerative changes are noted of the spine and pubic symphysis without focal destructive process.  There is atherosclerotic calcification of the  aorta.  No significant inguinal lymphadenopathy.                                 Medical Decision Making:   Initial Assessment:   This is an urgent evaluation of a 48-year-old female with a history of pancreatitis and hypertension who presents the ED for evaluation epigastric abdominal pain. Patient is afebrile.  Physical exam significant for epigastric abdominal tenderness to palpation with guarding. Abdomen is nondistended. No ascites.  Will obtain labs and CT of the abdomen.  Will give IV normal saline.  Clinical Tests:   Lab Tests: Ordered and Reviewed  Radiological Study: Ordered and Reviewed  ED Management:  This is an urgent evaluation of a 48-year-old female with past medical history of pancreatitis and hypertension who presents the ED for evaluation of epigastric abdominal pain and dysuria.  Patient was hypertensive but vitals otherwise stable. The patient reports noncompliance with prescribed hypertensive medications.  The patient was afebrile.  The physical exam is significant for epigastric abdominal tenderness to palpation. No ascites.  No abdominal distention. Lungs are clear to auscultation. Cardiac exam regular rate and rhythm. CBC showed no leukocytosis or anemia.  CMP showed no electrolyte abnormalities.  Lipase 17.  UA consistent with urinary tract infection.  CT abdomen pelvis showed no signs of pancreatitis.  Patient treated initially with IV fluids, Pepcid, GI cocktail, and Zofran with minimal pain. Upon re-evaluation, patient continued to complain of pain and was visibly tremulous. Discussed the case with Dr. Kam.  Etiology of patient's symptoms likely alcoholic gastritis and alcohol withdrawal.  Discussed alcohol use with the patient.  Patient states that she desires to stop drinking alcohol.  Treated patient with Ativan the ED with significant improvement of symptoms. Upon re-evaluation, patient was resting comfortably in the recliner. Initiated treatment for UTI with Rocephin in the ED.   Prior to discharge, patient became afebrile and hypertensive despite treatment with antipyretics.  Discussed case with Dr. Freeman.  Patient will be admitted to Dr. Turk for observation.  Admit orders placed.  Patient will be started on Valium.  Lactic acid pending.    I discussed the case with Dr. Kam who is in agreement with my assessment and plan.                  Attending Attestation:     Physician Attestation Statement for NP/PA:   I have conducted a face to face encounter with this patient in addition to the NP/PA, due to    Other NP/PA Attestation Additions:    History of Present Illness: Hx etoh abuse, here for abd pain   Physical Exam: Alert NAD, mild epigastric pain no g/R/D   Medical Decision Making: Labs showed UTI, ct neg, IV abx given, spiked fever, BP high, admit for UTI/Sepsis vs withdrawal symptoms, given IV Ativan                    Clinical Impression:   The primary encounter diagnosis was Urinary tract infection without hematuria, site unspecified. A diagnosis of Alcoholic gastritis, presence of bleeding unspecified, unspecified chronicity was also pertinent to this visit.                             Enrike Peck PA-C  10/05/18 2142       Enrike Peck PA-C  10/05/18 2239       Jhonatan Kam MD  10/05/18 2823

## 2018-10-06 VITALS
HEART RATE: 83 BPM | TEMPERATURE: 99 F | BODY MASS INDEX: 22.02 KG/M2 | SYSTOLIC BLOOD PRESSURE: 176 MMHG | DIASTOLIC BLOOD PRESSURE: 114 MMHG | HEIGHT: 66 IN | OXYGEN SATURATION: 96 % | RESPIRATION RATE: 16 BRPM | WEIGHT: 137 LBS

## 2018-10-06 PROBLEM — K29.20 ACUTE ALCOHOLIC GASTRITIS WITHOUT HEMORRHAGE: Status: ACTIVE | Noted: 2018-10-06

## 2018-10-06 LAB
ALBUMIN SERPL BCP-MCNC: 2.9 G/DL
ANION GAP SERPL CALC-SCNC: 8 MMOL/L
BUN SERPL-MCNC: 7 MG/DL
CALCIUM SERPL-MCNC: 9 MG/DL
CHLORIDE SERPL-SCNC: 105 MMOL/L
CO2 SERPL-SCNC: 25 MMOL/L
CREAT SERPL-MCNC: 0.8 MG/DL
EST. GFR  (AFRICAN AMERICAN): >60 ML/MIN/1.73 M^2
EST. GFR  (NON AFRICAN AMERICAN): >60 ML/MIN/1.73 M^2
GLUCOSE SERPL-MCNC: 123 MG/DL
PHOSPHATE SERPL-MCNC: 2 MG/DL
POTASSIUM SERPL-SCNC: 3.1 MMOL/L
SODIUM SERPL-SCNC: 138 MMOL/L

## 2018-10-06 PROCEDURE — S4991 NICOTINE PATCH NONLEGEND: HCPCS | Performed by: PHYSICIAN ASSISTANT

## 2018-10-06 PROCEDURE — 63600175 PHARM REV CODE 636 W HCPCS: Performed by: NURSE PRACTITIONER

## 2018-10-06 PROCEDURE — 80069 RENAL FUNCTION PANEL: CPT

## 2018-10-06 PROCEDURE — C9113 INJ PANTOPRAZOLE SODIUM, VIA: HCPCS | Performed by: PHYSICIAN ASSISTANT

## 2018-10-06 PROCEDURE — 25000003 PHARM REV CODE 250: Performed by: NURSE PRACTITIONER

## 2018-10-06 PROCEDURE — 82607 VITAMIN B-12: CPT

## 2018-10-06 PROCEDURE — 36415 COLL VENOUS BLD VENIPUNCTURE: CPT

## 2018-10-06 PROCEDURE — 25000003 PHARM REV CODE 250: Performed by: PHYSICIAN ASSISTANT

## 2018-10-06 PROCEDURE — 82746 ASSAY OF FOLIC ACID SERUM: CPT

## 2018-10-06 PROCEDURE — 94761 N-INVAS EAR/PLS OXIMETRY MLT: CPT

## 2018-10-06 PROCEDURE — 63600175 PHARM REV CODE 636 W HCPCS: Performed by: PHYSICIAN ASSISTANT

## 2018-10-06 PROCEDURE — G0378 HOSPITAL OBSERVATION PER HR: HCPCS

## 2018-10-06 PROCEDURE — 25000003 PHARM REV CODE 250: Performed by: INTERNAL MEDICINE

## 2018-10-06 RX ORDER — SODIUM CHLORIDE 9 MG/ML
INJECTION, SOLUTION INTRAVENOUS CONTINUOUS
Status: DISCONTINUED | OUTPATIENT
Start: 2018-10-06 | End: 2018-10-06

## 2018-10-06 RX ORDER — CIPROFLOXACIN 500 MG/1
500 TABLET ORAL EVERY 12 HOURS
Qty: 20 TABLET | Refills: 0 | Status: SHIPPED | OUTPATIENT
Start: 2018-10-06 | End: 2018-10-16

## 2018-10-06 RX ORDER — DULOXETIN HYDROCHLORIDE 30 MG/1
30 CAPSULE, DELAYED RELEASE ORAL DAILY
Status: DISCONTINUED | OUTPATIENT
Start: 2018-10-06 | End: 2018-10-06 | Stop reason: HOSPADM

## 2018-10-06 RX ORDER — AMLODIPINE BESYLATE 5 MG/1
5 TABLET ORAL DAILY
Status: DISCONTINUED | OUTPATIENT
Start: 2018-10-06 | End: 2018-10-06

## 2018-10-06 RX ORDER — PANTOPRAZOLE SODIUM 40 MG/10ML
40 INJECTION, POWDER, LYOPHILIZED, FOR SOLUTION INTRAVENOUS DAILY
Status: DISCONTINUED | OUTPATIENT
Start: 2018-10-06 | End: 2018-10-06 | Stop reason: HOSPADM

## 2018-10-06 RX ORDER — DIAZEPAM 5 MG/1
5 TABLET ORAL 3 TIMES DAILY
Qty: 12 TABLET | Refills: 0 | Status: SHIPPED | OUTPATIENT
Start: 2018-10-06 | End: 2018-11-05

## 2018-10-06 RX ORDER — DIAZEPAM 5 MG/1
5 TABLET ORAL 2 TIMES DAILY
Status: DISCONTINUED | OUTPATIENT
Start: 2018-10-06 | End: 2018-10-06

## 2018-10-06 RX ORDER — DIAZEPAM 5 MG/1
5 TABLET ORAL 3 TIMES DAILY
Status: DISCONTINUED | OUTPATIENT
Start: 2018-10-06 | End: 2018-10-06 | Stop reason: HOSPADM

## 2018-10-06 RX ORDER — PHENAZOPYRIDINE HYDROCHLORIDE 100 MG/1
100 TABLET, FILM COATED ORAL
Qty: 6 TABLET | Refills: 0 | Status: SHIPPED | OUTPATIENT
Start: 2018-10-06 | End: 2018-10-08

## 2018-10-06 RX ORDER — CIPROFLOXACIN 500 MG/1
500 TABLET ORAL EVERY 12 HOURS
Status: DISCONTINUED | OUTPATIENT
Start: 2018-10-06 | End: 2018-10-06 | Stop reason: HOSPADM

## 2018-10-06 RX ORDER — ONDANSETRON 2 MG/ML
4 INJECTION INTRAMUSCULAR; INTRAVENOUS EVERY 8 HOURS PRN
Status: DISCONTINUED | OUTPATIENT
Start: 2018-10-06 | End: 2018-10-06 | Stop reason: HOSPADM

## 2018-10-06 RX ORDER — AMLODIPINE BESYLATE 5 MG/1
5 TABLET ORAL ONCE
Status: DISCONTINUED | OUTPATIENT
Start: 2018-10-06 | End: 2018-10-06 | Stop reason: HOSPADM

## 2018-10-06 RX ORDER — PHENAZOPYRIDINE HYDROCHLORIDE 100 MG/1
100 TABLET, FILM COATED ORAL
Status: DISCONTINUED | OUTPATIENT
Start: 2018-10-06 | End: 2018-10-06 | Stop reason: HOSPADM

## 2018-10-06 RX ORDER — IBUPROFEN 200 MG
1 TABLET ORAL
Status: DISCONTINUED | OUTPATIENT
Start: 2018-10-06 | End: 2018-10-06 | Stop reason: HOSPADM

## 2018-10-06 RX ORDER — DIAZEPAM 10 MG/2ML
2 INJECTION INTRAMUSCULAR EVERY 6 HOURS PRN
Status: DISCONTINUED | OUTPATIENT
Start: 2018-10-06 | End: 2018-10-06 | Stop reason: RX

## 2018-10-06 RX ORDER — DIAZEPAM 2 MG/1
2 TABLET ORAL EVERY 6 HOURS PRN
Status: DISCONTINUED | OUTPATIENT
Start: 2018-10-06 | End: 2018-10-06

## 2018-10-06 RX ORDER — LISINOPRIL 20 MG/1
40 TABLET ORAL DAILY
Status: DISCONTINUED | OUTPATIENT
Start: 2018-10-06 | End: 2018-10-06

## 2018-10-06 RX ADMIN — DIAZEPAM 2 MG: 2 TABLET ORAL at 04:10

## 2018-10-06 RX ADMIN — FOLIC ACID: 5 INJECTION, SOLUTION INTRAMUSCULAR; INTRAVENOUS; SUBCUTANEOUS at 11:10

## 2018-10-06 RX ADMIN — LISINOPRIL 40 MG: 20 TABLET ORAL at 09:10

## 2018-10-06 RX ADMIN — PHENAZOPYRIDINE HYDROCHLORIDE 100 MG: 100 TABLET ORAL at 11:10

## 2018-10-06 RX ADMIN — SODIUM CHLORIDE: 0.9 INJECTION, SOLUTION INTRAVENOUS at 04:10

## 2018-10-06 RX ADMIN — NICOTINE 1 PATCH: 21 PATCH, EXTENDED RELEASE TRANSDERMAL at 04:10

## 2018-10-06 RX ADMIN — DIAZEPAM 2 MG: 2 TABLET ORAL at 11:10

## 2018-10-06 RX ADMIN — PANTOPRAZOLE SODIUM 40 MG: 40 INJECTION, POWDER, LYOPHILIZED, FOR SOLUTION INTRAVENOUS at 10:10

## 2018-10-06 RX ADMIN — DULOXETINE 30 MG: 30 CAPSULE, DELAYED RELEASE ORAL at 09:10

## 2018-10-06 RX ADMIN — CIPROFLOXACIN HYDROCHLORIDE 500 MG: 500 TABLET, FILM COATED ORAL at 11:10

## 2018-10-06 RX ADMIN — AMLODIPINE BESYLATE 5 MG: 5 TABLET ORAL at 09:10

## 2018-10-06 NOTE — ASSESSMENT & PLAN NOTE
Peripheral neuropathy likely due to drinking as this is chronic issues. Last drink yesterday. Wants to eat. Discussed risk with continue drinking. Patient have been in rehab and meetings in past. Tearful and wants to try to quit drinking again.CIWA-Ar 1 very mild. Start banana bag, thiamine, folate, valium TID and taper. Check vitamin b 12 and folate. Seizure precaution. Neuro cks.

## 2018-10-06 NOTE — PLAN OF CARE
10/06/18 1508   Discharge Assessment   Assessment Type Discharge Planning Assessment   Confirmed/corrected address and phone number on facesheet? Yes   Assessment information obtained from? Patient   Communicated expected length of stay with patient/caregiver yes   Prior to hospitilization cognitive status: Alert/Oriented   Prior to hospitalization functional status: Independent   Current cognitive status: Alert/Oriented   Current Functional Status: Independent   Facility Arrived From: Home   Lives With spouse;child(bernardo), dependent   Able to Return to Prior Arrangements yes   Is patient able to care for self after discharge? Yes   Who are your caregiver(s) and their phone number(s)? Spouse-Delbert: [phone]   Patient's perception of discharge disposition home or selfcare   Readmission Within The Last 30 Days no previous admission in last 30 days   Patient currently being followed by outpatient case management? No   Patient currently receives any other outside agency services? No   Equipment Currently Used at Home none   Do you have any problems affording any of your prescribed medications? No   Is the patient taking medications as prescribed? yes   Does the patient have transportation home? Yes   Transportation Available car;family or friend will provide   Does the patient receive services at the [facility]? No   Discharge Plan A Home with family   Discharge Plan B Other  (TBD)   Patient/Family In Agreement With Plan yes   Readmission Questionnaire   Have you felt down, depressed, or hopeless? Unable to Assess   Have you felt little interest or pleasure in doing things? Unable to assess     Patient is independent at home with spouse and child; spouse can assist if needed; no assist usually needed; no current services or DME; no needs anticipated    PCP: Issa Alcantar MD     Extended Emergency Contact Information  Primary Emergency Contact: Fran Ambrocio  Address: [address] [place] [address]           [place] [address]  "[place]  Home Phone: [phone]  Relation: Spouse     [facility] - [place], [place] - [address] AT [facility] [address]  [address]  [place] [address]  Phone: [phone] Fax: [phone]    [facility] - [facility] [place] - [address] AT [facility] [address]  [address]  [place] [address]  Phone: [phone] Fax: [phone]    [facility] [place] [address]  [address]  [place] [address]  Phone: [phone] Fax: [phone]    [place], [place] - [address] [facility] [facility]  [address] [facility] [place] [address]  Phone: [phone] Fax: [phone]  Extended Emergency Contact Information  Primary Emergency Contact: Fran Ambrocio  Address: [address] [place] [address] [facility]  Home Phone: [phone]  Relation: Spouse     LCSW/Patient: Discharge Planning Review   LCSW to patient's room to discuss: Help at Home and who helps the patient manage care at home   LCSW explained role to patient   "Discharge planning begins on Admission" brochure discussed and placed in "My Health Packet" at bedside   LCSW's name and contact info placed on white board; along with support person at home   LCSW discussed patient preferences for providers if services or equipment is ordered   1)  Warning signs/symptoms information reviewed with and provided to patient in blue folder  2)  Discharge planning begins upon admission   3)  Discussed and reinforced patient responsibility for managing health care at home including:        a) Acquiring prescribed medications; b) following-through with scheduled follow-up appointments or scheduling those that could not be set; and c) monitoring health at home.       "

## 2018-10-06 NOTE — ASSESSMENT & PLAN NOTE
Possible early pyelonephritis. Urine culture grew GNR. Fever subsided this am. Switch to Cipro and start pyridium x 2 days. Follow up on ID.

## 2018-10-06 NOTE — NURSING
Pt arrives to room via WC by transporter accompanied by male family member and female child.  Pt ambulates to bed, steady gait.  Bed in lowest position, brakes locked, side rails up x 3, call bell w/i reach.  No distress noted.

## 2018-10-06 NOTE — SUBJECTIVE & OBJECTIVE
Past Medical History:   Diagnosis Date    Anxiety     Bipolar disorder     Hypertension     Pancreatitis        Past Surgical History:   Procedure Laterality Date    BACK SURGERY      basal cell carcinoma surgery       SECTION         Review of patient's allergies indicates:   Allergen Reactions    Sulfa (sulfonamide antibiotics) Rash       No current facility-administered medications on file prior to encounter.      Current Outpatient Medications on File Prior to Encounter   Medication Sig    amlodipine (NORVASC) 5 MG tablet Take 1 tablet (5 mg total) by mouth once daily.    duloxetine (CYMBALTA) 30 MG capsule Take 1 capsule (30 mg total) by mouth once daily.    lisinopril 10 MG tablet Take 4 tablets (40 mg total) by mouth once daily.     Family History     Problem Relation (Age of Onset)    Cancer Paternal Grandmother    Drug abuse Mother, Father        Tobacco Use    Smoking status: Current Every Day Smoker     Packs/day: 1.50     Types: Cigarettes    Smokeless tobacco: Never Used   Substance and Sexual Activity    Alcohol use: Yes     Alcohol/week: 3.0 oz     Types: 5 Shots of liquor per week     Comment: every day    Drug use: Yes     Types: Cocaine, Marijuana     Comment: subboxin, crack cocaine; last time used 2 days ago     Sexual activity: Not Currently     Partners: Male     Review of Systems   Constitutional: Positive for fatigue and fever. Negative for activity change, appetite change and chills.   HENT: Negative.    Eyes: Negative.    Respiratory: Negative.    Cardiovascular: Negative.    Gastrointestinal: Positive for abdominal pain, constipation, nausea and vomiting. Negative for abdominal distention and diarrhea.   Endocrine: Negative.    Genitourinary: Positive for dysuria.   Musculoskeletal: Negative.    Skin: Negative.    Allergic/Immunologic: Negative.    Neurological: Positive for tremors.   Hematological: Negative.    Psychiatric/Behavioral: Negative.      Objective:      Vital Signs (Most Recent):  Temp: 98.7 °F (37.1 °C) (10/06/18 1113)  Pulse: 83 (10/06/18 1113)  Resp: 16 (10/06/18 1113)  BP: (!) 176/114 (10/06/18 1113)  SpO2: 96 % (10/06/18 1113) Vital Signs (24h Range):  Temp:  [98.1 °F (36.7 °C)-102.4 °F (39.1 °C)] 98.7 °F (37.1 °C)  Pulse:  [71-98] 83  Resp:  [16-20] 16  SpO2:  [94 %-99 %] 96 %  BP: (147-224)/() 176/114     Weight: 62.1 kg (137 lb 0 oz)  Body mass index is 22.11 kg/m².    Physical Exam   Constitutional: She is oriented to person, place, and time. She appears well-developed and well-nourished. No distress.   HENT:   Head: Atraumatic.   Eyes: EOM are normal. Pupils are equal, round, and reactive to light.   Neck: Neck supple.   Cardiovascular: Normal rate and regular rhythm.   Pulmonary/Chest: Effort normal and breath sounds normal. No respiratory distress.   Abdominal: Soft. Bowel sounds are normal. There is tenderness (lower abdomen pain ).   Musculoskeletal: Normal range of motion. She exhibits no edema or deformity.   Neurological: She is alert and oriented to person, place, and time.   Mild tremors on hand extension.    Skin: Skin is warm and dry.   Psychiatric: She has a normal mood and affect.          Significant Labs: All pertinent labs within the past 24 hours have been reviewed.    Significant Imaging: I have reviewed and interpreted all pertinent imaging results/findings within the past 24 hours.

## 2018-10-06 NOTE — ED NOTES
Instructed patient on need for lactic acid blood draw and the importance of drawing the lab prior to morning labs.  Patient verbalized understanding of need but continues to refuse additional blood draw due to 2 attempts at collection prior to me assuming care of patient.  Orders noted that patient to have cardiac monitoring and patient put on transport monitor for transport to floor.  Denies pain at this time.

## 2018-10-06 NOTE — DISCHARGE SUMMARY
"[facility] Medicine  Discharge Summary      Patient Name: Fabiola Ambrocio  MRN: [id number]  Admission Date: 10/5/2018  Hospital Length of Stay: 0 days  Discharge Date and Time:  10/06/2018 4:20 PM  Attending Physician: No att. providers found   Discharging Provider: Radha Peck NP  Primary Care Provider: Issa Alcantar MD      HPI:   47 yo female with significant history for chronic numbness in both foot for "along time", alcohol abuse-2 pints of hard liquor daily, epigastric and lower abdominal "shart" pain radiating to right flank associated with dysuria x 3 days and elevated blood pressure 200/160. Had 4 episode of non bloody emesis 2 days ago. Reports not taking home lisinopril and norvasc. Last drink last night. CT AP without contrast hepatomegaly, mild distention of urinary bladder, cholelithiasis and no peripancreatic inflammation. CXR clear. UA evidence of infection and started on rocephin. Urine culture pending. In ED, received ativan for tremors. Received also pecid, GI cocktail, and zofran. Plan was to discharge patient from ED but patient developed temp 102.4 and /123. Patient placed in observation for alcoholic gastritis and UTI.     * No surgery found *      Hospital Course:   Patient admitted for alcohol gastritis and UTI possible early pyelonephritis. Symptoms improve overnight with bowel rest and supportive care -pain control, IVF. Vtamin b 12 and folate pending. UC grew GNR. Patient stressed eagerness to quit drinking again. Rocephin started in ED. Fever subsided, abdominal pain improved, and tolerated diet with no issue. Patient discharge home on valium taper of 6 days, cipro x 10 days and pyridium x 2 days. Patient discharge home with close PCP follow up. Instruct to schedule appointment with PCP in 1-2 days. Encourage smoking cessation as well.         Consults:     No new Assessment & Plan notes have been filed under this hospital service " since the last note was generated.  Service: Hospital Medicine    Final Active Diagnoses:    Diagnosis Date Noted POA    PRINCIPAL PROBLEM:  Acute alcoholic gastritis without hemorrhage [K29.20] 10/06/2018 Unknown    Urinary tract infection with hematuria [N39.0] 10/05/2018 Yes    Tobacco abuse [Z72.0] 02/07/2017 Yes    Essential hypertension, benign [I10] 02/07/2017 Yes      Problems Resolved During this Admission:       Discharged Condition: good    Disposition: Home or Self Care    Follow Up:  Follow-up Information     Issa Alcantar MD. Schedule an appointment as soon as possible for a visit in 2 days.    Specialty:  General Practice  Why:  As needed  Contact information:  [facility] [address]  [place] [id number] [phone]                 Patient Instructions:      Diet Cardiac     Notify your health care provider if you experience any of the following:  temperature >100.4     Notify your health care provider if you experience any of the following:  persistent dizziness, light-headedness, or visual disturbances     Notify your health care provider if you experience any of the following:  severe uncontrolled pain     Activity as tolerated       Significant Diagnostic Studies: Labs: All labs within the past 24 hours have been reviewed    Pending Diagnostic Studies:     Procedure Component Value Units Date/Time    Folate [[id number]] Collected:  10/06/18 1157    Order Status:  Sent Lab Status:  In process Updated:  10/06/18 1158    Specimen:  Blood     Vitamin B12 [[id number]] Collected:  10/06/18 1157    Order Status:  Sent Lab Status:  In process Updated:  10/06/18 1158    Specimen:  Blood          Medications:  Reconciled Home Medications:      Medication List      START taking these medications    ciprofloxacin HCl 500 MG tablet  Commonly known as:  CIPRO  Take 1 tablet (500 mg total) by mouth every 12 (twelve) hours. for 10 days     diazePAM 5 MG tablet  Commonly known as:  VALIUM  Take 1  tablet (5 mg total) by mouth 3 (three) times daily. Take 5 mg 3 times a day 10/6, 10/7 followed by 5 mg 2 times a day 10/8, 10/9, followed by 5 mg once a day 10/10, 10/11     phenazopyridine 100 MG tablet  Commonly known as:  PYRIDIUM  Take 1 tablet (100 mg total) by mouth 3 (three) times daily with meals. for 2 days        CONTINUE taking these medications    amLODIPine 5 MG tablet  Commonly known as:  NORVASC  Take 1 tablet (5 mg total) by mouth once daily.     DULoxetine 30 MG capsule  Commonly known as:  CYMBALTA  Take 1 capsule (30 mg total) by mouth once daily.     lisinopril 10 MG tablet  Take 4 tablets (40 mg total) by mouth once daily.            Indwelling Lines/Drains at time of discharge:   Lines/Drains/Airways          None          Time spent on the discharge of patient: 35 minutes  Patient was seen and examined on the date of discharge and determined to be suitable for discharge.         Radha Peck NP  Department of Hospital Medicine  [facility]

## 2018-10-06 NOTE — PLAN OF CARE
10/06/18 1530   Final Note   Assessment Type Final Discharge Note   Discharge Disposition Home   What phone number can be called within the next 1-3 days to see how you are doing after discharge? ([phone] )   Hospital Follow Up  Appt(s) scheduled? No  (Unable to schedule)   Discharge plans and expectations educations in teach back method with documentation complete? Yes   Right Care Referral Info   Post Acute Recommendation No Care

## 2018-10-06 NOTE — H&P
"[facility] Medicine  History & Physical    Patient Name: Fabiola Ambrocio  MRN: [id number]  Admission Date: 10/5/2018  Attending Physician: José Harman MD   Primary Care Provider: Issa Alcantar MD         Patient information was obtained from patient and ER records.     Subjective:     Principal Problem:<principal problem not specified>    Chief Complaint:   Chief Complaint   Patient presents with    Abdominal Pain     bilateral abdominal pain that radiates to lower back; pt reports buring with urination; denies discharge/foul odor    Numbness     "pins and needles" feeling to bilateral feet; pt stated that she is noncompliant with HTN medication and when BP is elevated has this senstation to feet        HPI: 47 yo female with significant history for chronic numbness in both foot for "along time", alcohol abuse-2 pints of hard liquor daily, epigastric and lower abdominal "shart" pain radiating to right flank associated with dysuria x 3 days and elevated blood pressure 200/160. Had 4 episode of non bloody emesis 2 days ago. Reports not taking home lisinopril and norvasc. Last drink last night. CT AP without contrast hepatomegaly, mild distention of urinary bladder, cholelithiasis and no peripancreatic inflammation. CXR clear. UA evidence of infection and started on rocephin. Urine culture pending. In ED, received ativan for tremors. Received also pecid, GI cocktail, and zofran. Plan was to discharge patient from ED but patient developed temp 102.4 and /123. Patient placed in observation for alcoholic gastritis and UTI.     Past Medical History:   Diagnosis Date    Anxiety     Bipolar disorder     Hypertension     Pancreatitis        Past Surgical History:   Procedure Laterality Date    BACK SURGERY      basal cell carcinoma surgery       SECTION         Review of patient's allergies indicates:   Allergen Reactions    Sulfa (sulfonamide antibiotics) " Rash       No current facility-administered medications on file prior to encounter.      Current Outpatient Medications on File Prior to Encounter   Medication Sig    amlodipine (NORVASC) 5 MG tablet Take 1 tablet (5 mg total) by mouth once daily.    duloxetine (CYMBALTA) 30 MG capsule Take 1 capsule (30 mg total) by mouth once daily.    lisinopril 10 MG tablet Take 4 tablets (40 mg total) by mouth once daily.     Family History     Problem Relation (Age of Onset)    Cancer Paternal Grandmother    Drug abuse Mother, Father        Tobacco Use    Smoking status: Current Every Day Smoker     Packs/day: 1.50     Types: Cigarettes    Smokeless tobacco: Never Used   Substance and Sexual Activity    Alcohol use: Yes     Alcohol/week: 3.0 oz     Types: 5 Shots of liquor per week     Comment: every day    Drug use: Yes     Types: Cocaine, Marijuana     Comment: subboxin, crack cocaine; last time used 2 days ago     Sexual activity: Not Currently     Partners: Male     Review of Systems   Constitutional: Positive for fatigue and fever. Negative for activity change, appetite change and chills.   HENT: Negative.    Eyes: Negative.    Respiratory: Negative.    Cardiovascular: Negative.    Gastrointestinal: Positive for abdominal pain, constipation, nausea and vomiting. Negative for abdominal distention and diarrhea.   Endocrine: Negative.    Genitourinary: Positive for dysuria.   Musculoskeletal: Negative.    Skin: Negative.    Allergic/Immunologic: Negative.    Neurological: Positive for tremors.   Hematological: Negative.    Psychiatric/Behavioral: Negative.      Objective:     Vital Signs (Most Recent):  Temp: 98.7 °F (37.1 °C) (10/06/18 1113)  Pulse: 83 (10/06/18 1113)  Resp: 16 (10/06/18 1113)  BP: (!) 176/114 (10/06/18 1113)  SpO2: 96 % (10/06/18 1113) Vital Signs (24h Range):  Temp:  [98.1 °F (36.7 °C)-102.4 °F (39.1 °C)] 98.7 °F (37.1 °C)  Pulse:  [71-98] 83  Resp:  [16-20] 16  SpO2:  [94 %-99 %] 96 %  BP:  (147-224)/() 176/114     Weight: 62.1 kg (137 lb 0 oz)  Body mass index is 22.11 kg/m².    Physical Exam   Constitutional: She is oriented to person, place, and time. She appears well-developed and well-nourished. No distress.   HENT:   Head: Atraumatic.   Eyes: EOM are normal. Pupils are equal, round, and reactive to light.   Neck: Neck supple.   Cardiovascular: Normal rate and regular rhythm.   Pulmonary/Chest: Effort normal and breath sounds normal. No respiratory distress.   Abdominal: Soft. Bowel sounds are normal. There is tenderness (lower abdomen pain ).   Musculoskeletal: Normal range of motion. She exhibits no edema or deformity.   Neurological: She is alert and oriented to person, place, and time.   Mild tremors on hand extension.    Skin: Skin is warm and dry.   Psychiatric: She has a normal mood and affect.          Significant Labs: All pertinent labs within the past 24 hours have been reviewed.    Significant Imaging: I have reviewed and interpreted all pertinent imaging results/findings within the past 24 hours.    Assessment/Plan:     Acute alcoholic gastritis without hemorrhage    Peripheral neuropathy likely due to drinking as this is chronic issues. Last drink yesterday. Wants to eat. Discussed risk with continue drinking. Patient have been in rehab and meetings in past. Tearful and wants to try to quit drinking again.CIWA-Ar 1 very mild. Start banana bag, thiamine, folate, valium TID and taper. Check vitamin b 12 and folate. Seizure precaution. Neuro cks.         Urinary tract infection with hematuria    Possible early pyelonephritis. Urine culture grew GNR. Fever subsided this am. Switch to Cipro and start pyridium x 2 days. Follow up on ID.         Essential hypertension, benign    Better controlled. Continue norvasc. Hold ACEI as slight bump in sCr. Repeating RFP now.           Tobacco abuse    Encourage smoking cessation. Nicotine patch while stay.             VTE Risk Mitigation (From  admission, onward)        Ordered     IP VTE LOW RISK PATIENT  Once      10/06/18 0026     Place SAMANTHA hose  Until discontinued      10/06/18 0026     Place sequential compression device  Until discontinued      10/06/18 0026             Radha Peck NP  Department of Hospital Medicine   [facility]

## 2018-10-06 NOTE — PROGRESS NOTES
Discharge instructions given to patient at bedside. Patient verbalized understanding of instructions and willingness to comply. Telemetry monitor discontinued, peripheral IV discontinued. Transport request submitted.

## 2018-10-06 NOTE — HOSPITAL COURSE
Patient admitted for alcohol gastritis and UTI possible early pyelonephritis. Symptoms improve overnight with bowel rest and supportive care -pain control, IVF. Vtamin b 12 and folate pending. UC grew GNR. Patient stressed eagerness to quit drinking again. Rocephin started in ED. Fever subsided, abdominal pain improved, and tolerated diet with no issue. Patient discharge home on valium taper of 6 days, cipro x 10 days and pyridium x 2 days. Patient discharge home with close PCP follow up. Instruct to schedule appointment with PCP in 1-2 days.

## 2018-10-06 NOTE — HPI
"47 yo female with significant history for chronic numbness in both foot for "along time", alcohol abuse-2 pints of hard liquor daily, epigastric and lower abdominal "shart" pain radiating to right flank associated with dysuria x 3 days and elevated blood pressure 200/160. Had 4 episode of non bloody emesis 2 days ago. Reports not taking home lisinopril and norvasc. Last drink last night. CT AP without contrast hepatomegaly, mild distention of urinary bladder, cholelithiasis and no peripancreatic inflammation. CXR clear. UA evidence of infection and started on rocephin. Urine culture pending. In ED, received ativan for tremors. Received also pecid, GI cocktail, and zofran. Plan was to discharge patient from ED but patient developed temp 102.4 and /123. Patient placed in observation for alcoholic gastritis and UTI.   "

## 2018-10-07 LAB
BACTERIA UR CULT: NORMAL
FOLATE SERPL-MCNC: 9.1 NG/ML
VIT B12 SERPL-MCNC: 298 PG/ML

## 2019-01-21 ENCOUNTER — HOSPITAL ENCOUNTER (EMERGENCY)
Facility: HOSPITAL | Age: 49
Discharge: HOME | End: 2019-01-21
Attending: EMERGENCY MEDICINE
Payer: MEDICAID

## 2019-01-21 VITALS
WEIGHT: 140 LBS | BODY MASS INDEX: 22.5 KG/M2 | TEMPERATURE: 98 F | SYSTOLIC BLOOD PRESSURE: 216 MMHG | RESPIRATION RATE: 18 BRPM | HEART RATE: 82 BPM | OXYGEN SATURATION: 98 % | HEIGHT: 66 IN | DIASTOLIC BLOOD PRESSURE: 116 MMHG

## 2019-01-21 DIAGNOSIS — K04.7 DENTAL ABSCESS: Primary | ICD-10-CM

## 2019-01-21 DIAGNOSIS — I10 HYPERTENSION, UNSPECIFIED TYPE: ICD-10-CM

## 2019-01-21 LAB
ALBUMIN SERPL BCP-MCNC: 3.5 G/DL
ALP SERPL-CCNC: 73 U/L
ALT SERPL W/O P-5'-P-CCNC: 21 U/L
ANION GAP SERPL CALC-SCNC: 12 MMOL/L
AST SERPL-CCNC: 26 U/L
B-HCG UR QL: NEGATIVE
BASOPHILS # BLD AUTO: 0.01 K/UL
BASOPHILS NFR BLD: 0.2 %
BILIRUB SERPL-MCNC: 0.8 MG/DL
BUN SERPL-MCNC: 11 MG/DL
CALCIUM SERPL-MCNC: 9.4 MG/DL
CHLORIDE SERPL-SCNC: 103 MMOL/L
CO2 SERPL-SCNC: 25 MMOL/L
CREAT SERPL-MCNC: 0.9 MG/DL
CTP QC/QA: YES
DIFFERENTIAL METHOD: ABNORMAL
EOSINOPHIL # BLD AUTO: 0 K/UL
EOSINOPHIL NFR BLD: 0.6 %
ERYTHROCYTE [DISTWIDTH] IN BLOOD BY AUTOMATED COUNT: 12.3 %
EST. GFR  (AFRICAN AMERICAN): >60 ML/MIN/1.73 M^2
EST. GFR  (NON AFRICAN AMERICAN): >60 ML/MIN/1.73 M^2
GLUCOSE SERPL-MCNC: 99 MG/DL
HCT VFR BLD AUTO: 42 %
HGB BLD-MCNC: 14.4 G/DL
LYMPHOCYTES # BLD AUTO: 1.1 K/UL
LYMPHOCYTES NFR BLD: 19.8 %
MCH RBC QN AUTO: 35.8 PG
MCHC RBC AUTO-ENTMCNC: 34.3 G/DL
MCV RBC AUTO: 105 FL
MONOCYTES # BLD AUTO: 0.5 K/UL
MONOCYTES NFR BLD: 9.6 %
NEUTROPHILS # BLD AUTO: 3.8 K/UL
NEUTROPHILS NFR BLD: 69.8 %
PLATELET # BLD AUTO: 198 K/UL
PMV BLD AUTO: 9.9 FL
POTASSIUM SERPL-SCNC: 3.2 MMOL/L
PROT SERPL-MCNC: 7.2 G/DL
RBC # BLD AUTO: 4.02 M/UL
SODIUM SERPL-SCNC: 140 MMOL/L
WBC # BLD AUTO: 5.4 K/UL

## 2019-01-21 PROCEDURE — 96366 THER/PROPH/DIAG IV INF ADDON: CPT

## 2019-01-21 PROCEDURE — 96375 TX/PRO/DX INJ NEW DRUG ADDON: CPT

## 2019-01-21 PROCEDURE — 25000003 PHARM REV CODE 250: Performed by: EMERGENCY MEDICINE

## 2019-01-21 PROCEDURE — 85025 COMPLETE CBC W/AUTO DIFF WBC: CPT

## 2019-01-21 PROCEDURE — 80053 COMPREHEN METABOLIC PANEL: CPT

## 2019-01-21 PROCEDURE — 99284 EMERGENCY DEPT VISIT MOD MDM: CPT

## 2019-01-21 PROCEDURE — 63600175 PHARM REV CODE 636 W HCPCS: Performed by: EMERGENCY MEDICINE

## 2019-01-21 PROCEDURE — 96365 THER/PROPH/DIAG IV INF INIT: CPT

## 2019-01-21 PROCEDURE — 81025 URINE PREGNANCY TEST: CPT | Performed by: NURSE PRACTITIONER

## 2019-01-21 RX ORDER — AMLODIPINE BESYLATE 5 MG/1
5 TABLET ORAL DAILY
Qty: 30 TABLET | Refills: 0 | Status: SHIPPED | OUTPATIENT
Start: 2019-01-21 | End: 2021-11-16 | Stop reason: ALTCHOICE

## 2019-01-21 RX ORDER — HYDROMORPHONE HYDROCHLORIDE 2 MG/ML
0.5 INJECTION, SOLUTION INTRAMUSCULAR; INTRAVENOUS; SUBCUTANEOUS
Status: COMPLETED | OUTPATIENT
Start: 2019-01-21 | End: 2019-01-21

## 2019-01-21 RX ORDER — AMLODIPINE BESYLATE 5 MG/1
5 TABLET ORAL
Status: COMPLETED | OUTPATIENT
Start: 2019-01-21 | End: 2019-01-21

## 2019-01-21 RX ORDER — LIDOCAINE HYDROCHLORIDE AND EPINEPHRINE 10; 10 MG/ML; UG/ML
10 INJECTION, SOLUTION INFILTRATION; PERINEURAL ONCE
Status: COMPLETED | OUTPATIENT
Start: 2019-01-21 | End: 2019-01-21

## 2019-01-21 RX ORDER — HYDROCODONE BITARTRATE AND ACETAMINOPHEN 5; 325 MG/1; MG/1
1 TABLET ORAL EVERY 6 HOURS PRN
Qty: 15 TABLET | Refills: 0 | Status: SHIPPED | OUTPATIENT
Start: 2019-01-21 | End: 2019-01-31

## 2019-01-21 RX ORDER — CLINDAMYCIN PHOSPHATE 150 MG/ML
900 INJECTION, SOLUTION INTRAVENOUS
Status: DISCONTINUED | OUTPATIENT
Start: 2019-01-21 | End: 2019-01-21

## 2019-01-21 RX ADMIN — CEFTRIAXONE 1 G: 1 INJECTION, SOLUTION INTRAVENOUS at 05:01

## 2019-01-21 RX ADMIN — HYDROMORPHONE HYDROCHLORIDE 0.5 MG: 2 INJECTION, SOLUTION INTRAMUSCULAR; INTRAVENOUS; SUBCUTANEOUS at 05:01

## 2019-01-21 RX ADMIN — AMLODIPINE BESYLATE 5 MG: 5 TABLET ORAL at 07:01

## 2019-01-21 RX ADMIN — LIDOCAINE HYDROCHLORIDE AND EPINEPHRINE 10 ML: 10; 10 INJECTION, SOLUTION INFILTRATION; PERINEURAL at 05:01

## 2019-01-21 NOTE — ED PROVIDER NOTES
Encounter Date: 2019  SORT MSE:  Pt is a 48 y.o. female who presents for emergent consideration for multiple dental abscesses, on clindamycin. Pt will be moved to room when one is available, otherwise will wait in waiting room with triage nurse supervision.  Pt arrived by ambulatory. She is not in distress. Orders have been placed. ARCENIO Conde DNP ACNP-BC 2019 4:44 PM        History     Chief Complaint   Patient presents with    Oral Swelling     Pt started abx for dental abcess on Saturday. Pt reports today that swelling has gotten worse and more painful.     Patient presents complaining of several days of left lower jaw pain and swelling. Seen by dentist on Saturday.  Prescribe clindamycin.  Presents here because of worsening swelling. No difficulty swallowing.  No change in voice.  Able to open close her mouth.          Review of patient's allergies indicates:   Allergen Reactions    Sulfa (sulfonamide antibiotics) Rash     Past Medical History:   Diagnosis Date    Anxiety     Bipolar disorder     Hypertension     Pancreatitis      Past Surgical History:   Procedure Laterality Date    BACK SURGERY      basal cell carcinoma surgery       SECTION       Family History   Problem Relation Age of Onset    Drug abuse Mother     Drug abuse Father     Cancer Paternal Grandmother         breast     Social History     Tobacco Use    Smoking status: Current Every Day Smoker     Packs/day: 1.00     Types: Cigarettes    Smokeless tobacco: Never Used   Substance Use Topics    Alcohol use: Yes     Alcohol/week: 3.0 oz     Types: 5 Shots of liquor per week     Comment: every few  days    Drug use: Yes     Types: Cocaine, Marijuana     Comment: subboxin, crack cocaine; last time used 2 days ago      Review of Systems   Constitutional: Negative for fever.   HENT: Positive for dental problem. Negative for drooling, sore throat, trouble swallowing and voice change.    Eyes: Negative for visual  disturbance.   Respiratory: Negative for shortness of breath.    Cardiovascular: Negative for chest pain.   Gastrointestinal: Negative for abdominal pain.   Genitourinary: Negative for difficulty urinating.   Musculoskeletal: Negative for back pain.   Skin: Negative for rash.   Neurological: Negative for headaches.       Physical Exam     Initial Vitals [01/21/19 1645]   BP Pulse Resp Temp SpO2   (!) 178/101 103 18 98.4 °F (36.9 °C) 98 %      MAP       --         Physical Exam    Nursing note and vitals reviewed.  Constitutional: She appears well-developed and well-nourished.   HENT:   Some facial swelling to left mandible.  No trismus.  No subglossal or submandibular tenderness or swelling.  There is fluctuance to the left lower lateral gingiva over the 1st molar.  There is some necrotic tissue beginning to the gums.   Cardiovascular: Normal rate, regular rhythm and normal heart sounds.   Pulmonary/Chest: Breath sounds normal.   Neurological: She is alert and oriented to person, place, and time.         ED Course   Procedures  Labs Reviewed   CBC W/ AUTO DIFFERENTIAL - Abnormal; Notable for the following components:       Result Value     (*)     MCH 35.8 (*)     All other components within normal limits   COMPREHENSIVE METABOLIC PANEL - Abnormal; Notable for the following components:    Potassium 3.2 (*)     All other components within normal limits   POCT URINE PREGNANCY          Imaging Results    None         will incise the gum line to allow for drainage.  Continue clindamycin.  Will prescribe pain medicine.  Patient follow-up with Oral surgery.                       Clinical Impression:   The primary encounter diagnosis was Dental abscess. A diagnosis of Hypertension, unspecified type was also pertinent to this visit.                             Tj Wheeler MD  01/21/19 [id number]

## 2021-09-23 ENCOUNTER — HOSPITAL ENCOUNTER (EMERGENCY)
Facility: HOSPITAL | Age: 51
Discharge: LEFT BEFORE DISCHARGE | End: 2021-09-23
Payer: MEDICAID

## 2021-09-23 VITALS
BODY MASS INDEX: 24.91 KG/M2 | WEIGHT: 155 LBS | OXYGEN SATURATION: 97 % | HEIGHT: 66 IN | TEMPERATURE: 98 F | SYSTOLIC BLOOD PRESSURE: 167 MMHG | DIASTOLIC BLOOD PRESSURE: 83 MMHG | RESPIRATION RATE: 16 BRPM | HEART RATE: 57 BPM

## 2021-09-23 LAB
ALBUMIN SERPL BCP-MCNC: 3.4 G/DL (ref 3.5–5.2)
ALP SERPL-CCNC: 74 U/L (ref 55–135)
ALT SERPL W/O P-5'-P-CCNC: 14 U/L (ref 10–44)
ANION GAP SERPL CALC-SCNC: 11 MMOL/L (ref 8–16)
AST SERPL-CCNC: 18 U/L (ref 10–40)
BASOPHILS # BLD AUTO: 0.03 K/UL (ref 0–0.2)
BASOPHILS NFR BLD: 0.3 % (ref 0–1.9)
BILIRUB SERPL-MCNC: 0.3 MG/DL (ref 0.1–1)
BUN SERPL-MCNC: 11 MG/DL (ref 6–20)
CALCIUM SERPL-MCNC: 9.1 MG/DL (ref 8.7–10.5)
CHLORIDE SERPL-SCNC: 102 MMOL/L (ref 95–110)
CK SERPL-CCNC: 103 U/L (ref 20–180)
CO2 SERPL-SCNC: 24 MMOL/L (ref 23–29)
CREAT SERPL-MCNC: 0.8 MG/DL (ref 0.5–1.4)
DIFFERENTIAL METHOD: ABNORMAL
EOSINOPHIL # BLD AUTO: 0 K/UL (ref 0–0.5)
EOSINOPHIL NFR BLD: 0.2 % (ref 0–8)
ERYTHROCYTE [DISTWIDTH] IN BLOOD BY AUTOMATED COUNT: 14.6 % (ref 11.5–14.5)
EST. GFR  (AFRICAN AMERICAN): >60 ML/MIN/1.73 M^2
EST. GFR  (NON AFRICAN AMERICAN): >60 ML/MIN/1.73 M^2
GLUCOSE SERPL-MCNC: 96 MG/DL (ref 70–110)
HCT VFR BLD AUTO: 40.5 % (ref 37–48.5)
HGB BLD-MCNC: 14 G/DL (ref 12–16)
IMM GRANULOCYTES # BLD AUTO: 0.05 K/UL (ref 0–0.04)
IMM GRANULOCYTES NFR BLD AUTO: 0.6 % (ref 0–0.5)
LYMPHOCYTES # BLD AUTO: 1.1 K/UL (ref 1–4.8)
LYMPHOCYTES NFR BLD: 12.9 % (ref 18–48)
MCH RBC QN AUTO: 32.6 PG (ref 27–31)
MCHC RBC AUTO-ENTMCNC: 34.6 G/DL (ref 32–36)
MCV RBC AUTO: 94 FL (ref 82–98)
MONOCYTES # BLD AUTO: 0.5 K/UL (ref 0.3–1)
MONOCYTES NFR BLD: 5.1 % (ref 4–15)
NEUTROPHILS # BLD AUTO: 7.1 K/UL (ref 1.8–7.7)
NEUTROPHILS NFR BLD: 80.9 % (ref 38–73)
NRBC BLD-RTO: 0 /100 WBC
PLATELET # BLD AUTO: 224 K/UL (ref 150–450)
PMV BLD AUTO: 9.1 FL (ref 9.2–12.9)
POTASSIUM SERPL-SCNC: 3.6 MMOL/L (ref 3.5–5.1)
PROT SERPL-MCNC: 7.1 G/DL (ref 6–8.4)
RBC # BLD AUTO: 4.29 M/UL (ref 4–5.4)
SODIUM SERPL-SCNC: 137 MMOL/L (ref 136–145)
WBC # BLD AUTO: 8.79 K/UL (ref 3.9–12.7)

## 2021-09-23 PROCEDURE — 80053 COMPREHEN METABOLIC PANEL: CPT | Performed by: NURSE PRACTITIONER

## 2021-09-23 PROCEDURE — 82550 ASSAY OF CK (CPK): CPT | Performed by: NURSE PRACTITIONER

## 2021-09-23 PROCEDURE — 85025 COMPLETE CBC W/AUTO DIFF WBC: CPT | Performed by: NURSE PRACTITIONER

## 2021-09-23 PROCEDURE — 99900041 HC LEFT WITHOUT BEING SEEN- EMERGENCY

## 2021-10-18 ENCOUNTER — TELEPHONE (OUTPATIENT)
Dept: NEUROLOGY | Facility: CLINIC | Age: 51
End: 2021-10-18

## 2021-10-20 ENCOUNTER — TELEPHONE (OUTPATIENT)
Dept: NEUROLOGY | Facility: CLINIC | Age: 51
End: 2021-10-20

## 2021-10-21 ENCOUNTER — TELEPHONE (OUTPATIENT)
Dept: NEUROLOGY | Facility: CLINIC | Age: 51
End: 2021-10-21

## 2021-11-05 ENCOUNTER — OFFICE VISIT (OUTPATIENT)
Dept: NEUROLOGY | Facility: CLINIC | Age: 51
End: 2021-11-05
Payer: MEDICAID

## 2021-11-05 VITALS
WEIGHT: 161.19 LBS | HEART RATE: 89 BPM | SYSTOLIC BLOOD PRESSURE: 154 MMHG | DIASTOLIC BLOOD PRESSURE: 102 MMHG | BODY MASS INDEX: 25.9 KG/M2 | HEIGHT: 66 IN

## 2021-11-05 DIAGNOSIS — F43.10 PTSD (POST-TRAUMATIC STRESS DISORDER): ICD-10-CM

## 2021-11-05 DIAGNOSIS — R56.9 SEIZURES: Primary | ICD-10-CM

## 2021-11-05 PROCEDURE — 99999 PR PBB SHADOW E&M-EST. PATIENT-LVL IV: CPT | Mod: PBBFAC,,, | Performed by: STUDENT IN AN ORGANIZED HEALTH CARE EDUCATION/TRAINING PROGRAM

## 2021-11-05 PROCEDURE — 99204 OFFICE O/P NEW MOD 45 MIN: CPT | Mod: S$PBB,,, | Performed by: STUDENT IN AN ORGANIZED HEALTH CARE EDUCATION/TRAINING PROGRAM

## 2021-11-05 PROCEDURE — 99999 PR PBB SHADOW E&M-EST. PATIENT-LVL IV: ICD-10-PCS | Mod: PBBFAC,,, | Performed by: STUDENT IN AN ORGANIZED HEALTH CARE EDUCATION/TRAINING PROGRAM

## 2021-11-05 PROCEDURE — 99204 PR OFFICE/OUTPT VISIT, NEW, LEVL IV, 45-59 MIN: ICD-10-PCS | Mod: S$PBB,,, | Performed by: STUDENT IN AN ORGANIZED HEALTH CARE EDUCATION/TRAINING PROGRAM

## 2021-11-05 PROCEDURE — 99214 OFFICE O/P EST MOD 30 MIN: CPT | Mod: PBBFAC | Performed by: STUDENT IN AN ORGANIZED HEALTH CARE EDUCATION/TRAINING PROGRAM

## 2021-11-05 RX ORDER — LOSARTAN POTASSIUM AND HYDROCHLOROTHIAZIDE 25; 100 MG/1; MG/1
1 TABLET ORAL DAILY
COMMUNITY
Start: 2021-10-19

## 2021-11-05 RX ORDER — CLONIDINE HYDROCHLORIDE 0.3 MG/1
0.3 TABLET ORAL DAILY
COMMUNITY

## 2021-11-11 ENCOUNTER — TELEPHONE (OUTPATIENT)
Dept: NEUROLOGY | Facility: CLINIC | Age: 51
End: 2021-11-11
Payer: MEDICAID

## 2021-12-06 ENCOUNTER — HOSPITAL ENCOUNTER (OUTPATIENT)
Dept: RADIOLOGY | Facility: HOSPITAL | Age: 51
Discharge: HOME | End: 2021-12-06
Attending: STUDENT IN AN ORGANIZED HEALTH CARE EDUCATION/TRAINING PROGRAM
Payer: MEDICAID

## 2021-12-06 DIAGNOSIS — R56.9 SEIZURES: ICD-10-CM
